# Patient Record
Sex: FEMALE | Race: WHITE | NOT HISPANIC OR LATINO | URBAN - METROPOLITAN AREA
[De-identification: names, ages, dates, MRNs, and addresses within clinical notes are randomized per-mention and may not be internally consistent; named-entity substitution may affect disease eponyms.]

---

## 2021-06-11 ENCOUNTER — OFFICE VISIT (OUTPATIENT)
Dept: URBAN - METROPOLITAN AREA CLINIC 27 | Facility: CLINIC | Age: 71
End: 2021-06-11

## 2021-06-11 PROBLEM — 55822004 HYPERLIPIDEMIA: Status: ACTIVE | Noted: 2021-06-11

## 2021-06-11 PROBLEM — 59621000 ESSENTIAL HYPERTENSION: Status: ACTIVE | Noted: 2021-06-11

## 2021-06-11 PROBLEM — 40739000 DYSPHAGIA: Status: ACTIVE | Noted: 2021-06-11

## 2021-06-11 PROBLEM — 428283002 HISTORY OF POLYP OF COLON (SITUATION): Status: ACTIVE | Noted: 2021-06-11

## 2021-07-07 ENCOUNTER — OFFICE VISIT (OUTPATIENT)
Dept: URBAN - METROPOLITAN AREA SURGERY CENTER 7 | Facility: SURGERY CENTER | Age: 71
End: 2021-07-07

## 2021-08-20 ENCOUNTER — OFFICE VISIT (OUTPATIENT)
Dept: URBAN - METROPOLITAN AREA CLINIC 27 | Facility: CLINIC | Age: 71
End: 2021-08-20

## 2021-08-20 PROBLEM — 414916001 OBESITY: Status: INACTIVE | Noted: 2021-08-20

## 2021-12-21 ENCOUNTER — OFFICE VISIT (OUTPATIENT)
Dept: URBAN - METROPOLITAN AREA CLINIC 27 | Facility: CLINIC | Age: 71
End: 2021-12-21

## 2021-12-21 PROBLEM — 238131007 OVERWEIGHT: Status: ACTIVE | Noted: 2021-12-21

## 2021-12-21 PROBLEM — 266435005 GASTRO-ESOPHAGEAL REFLUX DISEASE WITHOUT ESOPHAGITIS: Status: ACTIVE | Noted: 2021-12-21

## 2022-01-18 ENCOUNTER — OFFICE VISIT (OUTPATIENT)
Dept: URBAN - METROPOLITAN AREA SURGERY CENTER 7 | Facility: SURGERY CENTER | Age: 72
End: 2022-01-18

## 2022-04-30 ENCOUNTER — TELEPHONE ENCOUNTER (OUTPATIENT)
Dept: URBAN - METROPOLITAN AREA CLINIC 121 | Facility: CLINIC | Age: 72
End: 2022-04-30

## 2022-04-30 RX ORDER — PANTOPRAZOLE SODIUM 40 MG/1
1 TABLET PO BID TABLET, DELAYED RELEASE ORAL
OUTPATIENT
Start: 2021-07-07 | End: 2021-10-27

## 2022-04-30 RX ORDER — PANTOPRAZOLE SODIUM 40 MG/1
TAKE 1 TABLET BY MOUTH TWICE A DAY TABLET, DELAYED RELEASE ORAL
OUTPATIENT
Start: 2021-10-27 | End: 2022-02-24

## 2022-04-30 RX ORDER — PANTOPRAZOLE SODIUM 40 MG/1
1 TABLET PO BID TABLET, DELAYED RELEASE ORAL
OUTPATIENT
Start: 2021-07-07

## 2022-05-01 ENCOUNTER — TELEPHONE ENCOUNTER (OUTPATIENT)
Dept: URBAN - METROPOLITAN AREA CLINIC 121 | Facility: CLINIC | Age: 72
End: 2022-05-01

## 2022-05-01 RX ORDER — LOSARTAN POTASSIUM 100 MG/1
TABLET, FILM COATED ORAL
Status: ACTIVE | COMMUNITY
Start: 2021-07-07

## 2022-05-01 RX ORDER — EZETIMIBE 10 MG/1
TABLET ORAL
Status: ACTIVE | COMMUNITY
Start: 2021-08-20

## 2022-06-27 ENCOUNTER — ERX REFILL RESPONSE (OUTPATIENT)
Dept: URBAN - METROPOLITAN AREA CLINIC 27 | Facility: CLINIC | Age: 72
End: 2022-06-27

## 2022-06-27 RX ORDER — PANTOPRAZOLE SODIUM 40 MG/1
TAKE ONE TABLET BY MOUTH TWICE A DAY TABLET, DELAYED RELEASE ORAL
Qty: 180 TABLET | Refills: 3 | OUTPATIENT

## 2022-06-27 RX ORDER — PANTOPRAZOLE SODIUM 40 MG/1
TAKE ONE TABLET BY MOUTH TWICE A DAY TABLET, DELAYED RELEASE ORAL
Qty: 180 TABLET | Refills: 0 | OUTPATIENT

## 2022-08-23 ENCOUNTER — TELEPHONE ENCOUNTER (OUTPATIENT)
Dept: URBAN - METROPOLITAN AREA CLINIC 27 | Facility: CLINIC | Age: 72
End: 2022-08-23

## 2022-08-25 PROBLEM — 429969003 FAMILY HISTORY OF POLYP OF COLON: Status: ACTIVE | Noted: 2021-06-11

## 2022-09-06 ENCOUNTER — TELEPHONE ENCOUNTER (OUTPATIENT)
Dept: URBAN - METROPOLITAN AREA CLINIC 27 | Facility: CLINIC | Age: 72
End: 2022-09-06

## 2022-09-06 ENCOUNTER — CLAIMS CREATED FROM THE CLAIM WINDOW (OUTPATIENT)
Dept: URBAN - METROPOLITAN AREA SURGERY CENTER 7 | Facility: SURGERY CENTER | Age: 72
End: 2022-09-06
Payer: MEDICARE

## 2022-09-06 DIAGNOSIS — K22.89 OTHER SPECIFIED DISEASE OF ESOPHAGUS: ICD-10-CM

## 2022-09-06 DIAGNOSIS — K31.89 OTHER DISEASES OF STOMACH AND DUODENUM: ICD-10-CM

## 2022-09-06 DIAGNOSIS — K20.0 EOSINOPHILIC ESOPHAGITIS: ICD-10-CM

## 2022-09-06 PROCEDURE — 43239 EGD BIOPSY SINGLE/MULTIPLE: CPT | Performed by: CLINIC/CENTER

## 2022-09-06 PROCEDURE — G8907 PT DOC NO EVENTS ON DISCHARG: HCPCS | Performed by: CLINIC/CENTER

## 2022-09-06 PROCEDURE — 43239 EGD BIOPSY SINGLE/MULTIPLE: CPT | Performed by: INTERNAL MEDICINE

## 2022-09-06 PROCEDURE — G8907 PT DOC NO EVENTS ON DISCHARG: HCPCS | Performed by: INTERNAL MEDICINE

## 2022-09-06 RX ORDER — EZETIMIBE 10 MG/1
TABLET ORAL
Status: ACTIVE | COMMUNITY
Start: 2021-08-20

## 2022-09-06 RX ORDER — PANTOPRAZOLE SODIUM 40 MG/1
TAKE ONE TABLET BY MOUTH TWICE A DAY TABLET, DELAYED RELEASE ORAL
Qty: 180 TABLET | Refills: 3 | Status: ACTIVE | COMMUNITY

## 2022-09-06 RX ORDER — LOSARTAN POTASSIUM 100 MG/1
TABLET, FILM COATED ORAL
Status: ACTIVE | COMMUNITY
Start: 2021-07-07

## 2022-12-06 ENCOUNTER — WEB ENCOUNTER (OUTPATIENT)
Dept: URBAN - METROPOLITAN AREA CLINIC 27 | Facility: CLINIC | Age: 72
End: 2022-12-06

## 2022-12-06 ENCOUNTER — TELEPHONE ENCOUNTER (OUTPATIENT)
Dept: URBAN - METROPOLITAN AREA CLINIC 27 | Facility: CLINIC | Age: 72
End: 2022-12-06

## 2022-12-06 ENCOUNTER — OFFICE VISIT (OUTPATIENT)
Dept: URBAN - METROPOLITAN AREA CLINIC 27 | Facility: CLINIC | Age: 72
End: 2022-12-06
Payer: MEDICARE

## 2022-12-06 VITALS
HEIGHT: 65 IN | HEART RATE: 93 BPM | BODY MASS INDEX: 29.49 KG/M2 | SYSTOLIC BLOOD PRESSURE: 130 MMHG | WEIGHT: 177 LBS | DIASTOLIC BLOOD PRESSURE: 71 MMHG

## 2022-12-06 DIAGNOSIS — R68.81 EARLY SATIETY: ICD-10-CM

## 2022-12-06 DIAGNOSIS — R19.7 DIARRHEA: ICD-10-CM

## 2022-12-06 DIAGNOSIS — R13.10 DYSPHAGIA: ICD-10-CM

## 2022-12-06 DIAGNOSIS — K20.0 EOSINOPHILIC ESOPHAGITIS: ICD-10-CM

## 2022-12-06 DIAGNOSIS — K21.9 GERD: ICD-10-CM

## 2022-12-06 PROBLEM — 235599003 EOSINOPHILIC ESOPHAGITIS: Status: ACTIVE | Noted: 2021-08-20

## 2022-12-06 PROBLEM — 40739000 DYSPHAGIA: Status: ACTIVE | Noted: 2022-12-06

## 2022-12-06 PROBLEM — 235595009 GASTROESOPHAGEAL REFLUX DISEASE: Status: ACTIVE | Noted: 2022-12-06

## 2022-12-06 PROCEDURE — 99213 OFFICE O/P EST LOW 20 MIN: CPT | Performed by: INTERNAL MEDICINE

## 2022-12-06 RX ORDER — LOSARTAN POTASSIUM 100 MG/1
TABLET, FILM COATED ORAL
Status: ACTIVE | COMMUNITY
Start: 2021-07-07

## 2022-12-06 RX ORDER — PANTOPRAZOLE SODIUM 40 MG/1
TAKE ONE TABLET BY MOUTH TWICE A DAY TABLET, DELAYED RELEASE ORAL
Qty: 180 TABLET | Refills: 3

## 2022-12-06 RX ORDER — PANTOPRAZOLE SODIUM 40 MG/1
TAKE ONE TABLET BY MOUTH TWICE A DAY TABLET, DELAYED RELEASE ORAL
Qty: 180 TABLET | Refills: 3 | Status: ACTIVE | COMMUNITY

## 2022-12-06 RX ORDER — EZETIMIBE 10 MG/1
TABLET ORAL
Status: ACTIVE | COMMUNITY
Start: 2021-08-20

## 2022-12-06 NOTE — HPI-TODAY'S VISIT:
Patient here for follow-up of dysphagia and reflux >few years.  She is currently doing very well from a GI standpoint.  She denies any reflux symptoms with pantoprazole 40 mg twice daily.   She notes that once daily dosing of the pantoprazole is not nearly as effective at controlling her reflux as twice daily is.  She is moderately adherent to an antireflux regimen.  She denies any dysphagia at present.  She does have occasional diarrhea and loose stools.  She denies any trigger for these episodes.  The loose stools are not nocturnal.  She does take a probiotic but does not take a fiber supplement or use Imodium.  She does have mild early satiety which seems to only occur at dinner.  She states "I am just not hungry at dinnertime".  She does not have any nausea.  She has not lost any significant weight. She underwent repeat upper endoscopy in September which revealed a 3 cm area of suspected Munguia's esophagus in the distal esophagus.  Mucosal friability and white specks were found along the entire length of the esophagus, though the mucosa looked (and felt) much less abnormal as compared to last year's exam.  A 3 cm hiatal hernia was noted, and mild nonerosive gastritis was present in the body and antrum.  Biopsies from the gastroesophageal junction showed 34 eosinophils per hpf.  No evidence of Munguia's was seen.  Biopsies from the proximal, mid and distal esophagus were unremarkable and did not show an increased number of eosinophils.    She initially underwent upper endoscopy in July 2021 which revealed an irregular appearance to the mid- and distal esophagus, with mild luminal narrowing and a somewhat nodular-appearing mucosa.  There was some difficulty in passage of the scope across this area, which felt ?fibrotic and stiff.  Biopsies from the mid and distal esophagus showed chronic esophagitis and carditis with up to 130 eosinophils per hpf.  She was subsequently started on pantoprazole twice daily with resolution of her dysphagia.  Her last colonoscopy was in early 2020; one adenoma was removed.  There is no family history of colon cancer but two siblings had colon polyps.

## 2022-12-09 ENCOUNTER — WEB ENCOUNTER (OUTPATIENT)
Dept: URBAN - METROPOLITAN AREA SURGERY CENTER 7 | Facility: SURGERY CENTER | Age: 72
End: 2022-12-09

## 2023-11-30 ENCOUNTER — ERX REFILL RESPONSE (OUTPATIENT)
Dept: URBAN - METROPOLITAN AREA CLINIC 27 | Facility: CLINIC | Age: 73
End: 2023-11-30

## 2023-11-30 RX ORDER — PANTOPRAZOLE SODIUM 40 MG/1
TAKE ONE TABLET BY MOUTH TWICE A DAY TABLET, DELAYED RELEASE ORAL
Qty: 180 TABLET | Refills: 3 | OUTPATIENT

## 2023-11-30 RX ORDER — PANTOPRAZOLE 40 MG/1
TAKE ONE TABLET BY MOUTH TWICE A DAY TABLET, DELAYED RELEASE ORAL
Qty: 180 TABLET | Refills: 3 | OUTPATIENT

## 2023-12-19 ENCOUNTER — OFFICE VISIT (OUTPATIENT)
Dept: URBAN - METROPOLITAN AREA CLINIC 27 | Facility: CLINIC | Age: 73
End: 2023-12-19
Payer: MEDICARE

## 2023-12-19 VITALS
WEIGHT: 180 LBS | SYSTOLIC BLOOD PRESSURE: 127 MMHG | RESPIRATION RATE: 17 BRPM | BODY MASS INDEX: 29.99 KG/M2 | DIASTOLIC BLOOD PRESSURE: 84 MMHG | HEART RATE: 99 BPM | HEIGHT: 65 IN

## 2023-12-19 DIAGNOSIS — R19.7 DIARRHEA: ICD-10-CM

## 2023-12-19 DIAGNOSIS — E66.8 OTHER OBESITY: ICD-10-CM

## 2023-12-19 DIAGNOSIS — K21.9 GERD: ICD-10-CM

## 2023-12-19 DIAGNOSIS — R68.81 EARLY SATIETY: ICD-10-CM

## 2023-12-19 DIAGNOSIS — R11.0 NAUSEA: ICD-10-CM

## 2023-12-19 DIAGNOSIS — R13.10 DYSPHAGIA: ICD-10-CM

## 2023-12-19 DIAGNOSIS — K20.0 EOSINOPHILIC ESOPHAGITIS: ICD-10-CM

## 2023-12-19 PROCEDURE — 99214 OFFICE O/P EST MOD 30 MIN: CPT | Performed by: INTERNAL MEDICINE

## 2023-12-19 RX ORDER — EZETIMIBE 10 MG/1
TABLET ORAL
Status: DISCONTINUED | COMMUNITY
Start: 2021-08-20

## 2023-12-19 RX ORDER — LOSARTAN POTASSIUM 100 MG/1
TABLET, FILM COATED ORAL
Status: ACTIVE | COMMUNITY
Start: 2021-07-07

## 2023-12-19 RX ORDER — PANTOPRAZOLE 40 MG/1
TAKE ONE TABLET BY MOUTH TWICE A DAY TABLET, DELAYED RELEASE ORAL
Qty: 180 TABLET | Refills: 3 | Status: ACTIVE | COMMUNITY

## 2023-12-19 NOTE — HPI-TODAY'S VISIT:
Patient here for follow-up of dysphagia and reflux >few years. Her reflux symptoms are well-controlled with pantoprazole 40 mg twice daily. She is adherent to an antireflux regimen. She denies any dysphagia at present. She has intermittent nausea which is worse in the morning, and for which she does a "sinus rinse because I have a lot of drainage"; this is helpful. She does not use any antiemetics. She has occasional early satiety, especially at dinnertime. A recent trial of FDGard apparently caused diarrhea. She does take a probiotic. She continues to have occasional diarrhea/loose stools. She typically has 2 stools in the morning that are nonbloody. She does take a probiotic. She does not take a fiber supplement or use Imodium. She is trying to keep her weight in check. Recent labs were normal.   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  Mucosal friability and white specks were found along the entire length of the esophagus, though the mucosa looked (and felt) much less abnormal as compared to a prior year's exam.  A 3 cm hiatal hernia was noted, and mild nonerosive gastritis was present in the body and antrum.  Biopsies from the gastroesophageal junction showed 34 eosinophils per hpf.  No evidence of Munguia's was seen.  Biopsies from the proximal, mid and distal esophagus were unremarkable and did not show an increased number of eosinophils.    She initially underwent upper endoscopy in July 2021 which revealed an irregular appearance to the mid- and distal esophagus, with mild luminal narrowing and a somewhat nodular-appearing mucosa.  There was some difficulty in passage of the scope across this area, which felt ?fibrotic and stiff.  Biopsies from the mid and distal esophagus showed chronic esophagitis and carditis with up to 130 eosinophils per hpf.  She was subsequently started on pantoprazole twice daily with resolution of her dysphagia.  Her last colonoscopy was in early 2020; one adenoma was removed.  There is no family history of colon cancer but two siblings had colon polyps.

## 2023-12-20 ENCOUNTER — DASHBOARD ENCOUNTERS (OUTPATIENT)
Age: 73
End: 2023-12-20

## 2024-11-24 ENCOUNTER — ERX REFILL RESPONSE (OUTPATIENT)
Dept: URBAN - METROPOLITAN AREA CLINIC 27 | Facility: CLINIC | Age: 74
End: 2024-11-24

## 2024-11-24 RX ORDER — PANTOPRAZOLE 40 MG/1
TAKE 1 TABLET BY MOUTH TWICE A DAY TABLET, DELAYED RELEASE ORAL
Qty: 180 TABLET | Refills: 3 | OUTPATIENT

## 2024-11-24 RX ORDER — PANTOPRAZOLE 40 MG/1
TAKE ONE TABLET BY MOUTH TWICE A DAY TABLET, DELAYED RELEASE ORAL
Qty: 180 TABLET | Refills: 3 | OUTPATIENT

## 2025-03-24 ENCOUNTER — TELEPHONE ENCOUNTER (OUTPATIENT)
Dept: URBAN - METROPOLITAN AREA CLINIC 27 | Facility: CLINIC | Age: 75
End: 2025-03-24

## 2025-03-24 ENCOUNTER — LAB OUTSIDE AN ENCOUNTER (OUTPATIENT)
Dept: URBAN - METROPOLITAN AREA CLINIC 27 | Facility: CLINIC | Age: 75
End: 2025-03-24

## 2025-03-24 ENCOUNTER — OFFICE VISIT (OUTPATIENT)
Dept: URBAN - METROPOLITAN AREA CLINIC 27 | Facility: CLINIC | Age: 75
End: 2025-03-24
Payer: MEDICARE

## 2025-03-24 DIAGNOSIS — R68.81 EARLY SATIETY: ICD-10-CM

## 2025-03-24 DIAGNOSIS — K21.9 GERD: ICD-10-CM

## 2025-03-24 DIAGNOSIS — Z83.719 FAMILY HISTORY OF COLONIC POLYPS: ICD-10-CM

## 2025-03-24 DIAGNOSIS — Z86.0101 HISTORY OF ADENOMATOUS POLYP OF COLON: ICD-10-CM

## 2025-03-24 DIAGNOSIS — R19.7 DIARRHEA: ICD-10-CM

## 2025-03-24 DIAGNOSIS — K20.0 EOSINOPHILIC ESOPHAGITIS: ICD-10-CM

## 2025-03-24 DIAGNOSIS — R11.0 NAUSEA: ICD-10-CM

## 2025-03-24 DIAGNOSIS — R13.10 DYSPHAGIA: ICD-10-CM

## 2025-03-24 DIAGNOSIS — E66.811 OBESITY (BMI 30.0-34.9): ICD-10-CM

## 2025-03-24 DIAGNOSIS — I10 HYPERTENSION: ICD-10-CM

## 2025-03-24 PROCEDURE — 99204 OFFICE O/P NEW MOD 45 MIN: CPT | Performed by: INTERNAL MEDICINE

## 2025-03-24 RX ORDER — PANTOPRAZOLE 40 MG/1
TAKE 1 TABLET BY MOUTH TWICE A DAY TABLET, DELAYED RELEASE ORAL
Qty: 180 TABLET | Refills: 3 | Status: ACTIVE | COMMUNITY

## 2025-03-24 RX ORDER — POLYETHYLENE GLYCOL 3350, SODIUM SULFATE ANHYDROUS, SODIUM BICARBONATE, SODIUM CHLORIDE, POTASSIUM CHLORIDE 236; 22.74; 6.74; 5.86; 2.97 G/4L; G/4L; G/4L; G/4L; G/4L
4000ML POWDER, FOR SOLUTION ORAL ONCE
Qty: 4000 MILLILITER | Refills: 0 | OUTPATIENT
Start: 2025-03-24 | End: 2025-03-25

## 2025-03-24 RX ORDER — LOSARTAN POTASSIUM 100 MG/1
TABLET, FILM COATED ORAL
Status: ACTIVE | COMMUNITY
Start: 2021-07-07

## 2025-03-24 NOTE — HPI-TODAY'S VISIT:
Patient here for repeat colonoscopy. Her last colonoscopy was in early 2020; one adenoma was removed. She is currently doing very well from a GI standpoint. Her reflux symptoms are very well-controlled with pantoprazole 40 mg twice daily. She is adherent to an antireflux regimen. She denies any dysphagia at present. She has no nausea (to which she attributes "sinus drainage") or early satiety. She denies significant diarrhea at present. She does take a probiotic but does not take a fiber supplement ("it made me sick") or use antidiarrheals. A prior trial of FDGard apparently caused diarrhea.   She initially underwent upper endoscopy in July 2021 which revealed an irregular appearance to the mid- and distal esophagus, with mild luminal narrowing and a somewhat nodular-appearing mucosa. There was some difficulty in passage of the scope across this area, which felt ?fibrotic and stiff. Biopsies from the mid and distal esophagus showed chronic esophagitis and carditis with up to 130 eosinophils per hpf. She was subsequently started on pantoprazole twice daily with resolution of her dysphagia. 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  Mucosal friability and white specks were found along the entire length of the esophagus, though the mucosa looked (and felt) much less abnormal as compared to the prior year's exam.  A 3 cm hiatal hernia was noted, and mild nonerosive gastritis was present in the body and antrum.  Biopsies from the gastroesophageal junction showed 34 eosinophils per hpf.  No evidence of Munguia's was seen.  Biopsies from the proximal, mid and distal esophagus were unremarkable and did not show an increased number of eosinophils. There is no family history of colon cancer but two siblings had colon polyps.

## 2025-04-10 ENCOUNTER — WEB ENCOUNTER (OUTPATIENT)
Dept: URBAN - METROPOLITAN AREA CLINIC 27 | Facility: CLINIC | Age: 75
End: 2025-04-10

## 2025-04-29 ENCOUNTER — OFFICE VISIT (OUTPATIENT)
Dept: URBAN - METROPOLITAN AREA SURGERY CENTER 7 | Facility: SURGERY CENTER | Age: 75
End: 2025-04-29

## 2025-04-29 RX ORDER — PANTOPRAZOLE 40 MG/1
TAKE 1 TABLET BY MOUTH TWICE A DAY TABLET, DELAYED RELEASE ORAL
Qty: 180 TABLET | Refills: 3 | Status: ACTIVE | COMMUNITY

## 2025-04-29 RX ORDER — LOSARTAN POTASSIUM 100 MG/1
TABLET, FILM COATED ORAL
Status: ACTIVE | COMMUNITY
Start: 2021-07-07